# Patient Record
(demographics unavailable — no encounter records)

---

## 2024-10-07 NOTE — HISTORY OF PRESENT ILLNESS
[FreeTextEntry1] : CPE and ankle bite  [de-identified] : This is a 21F with no significant PMHX who presnts for CPE and ankle bite with superificial infection.  1) ankle bite - got 1cm bug bite 3 days ago on skin which she has since been itching and now is slightly swollen/tender with overlying yellow pustules  Diet/exercise: Vegetarian. Bike as exercise.  Social history: Lives with partner. Student studying visual arts, oil painting.  Sexually active/Last menstrual period: Sexually active 1 male partner. No birth control.Dfers STD testing

## 2024-10-07 NOTE — HEALTH RISK ASSESSMENT
[Very Good] : ~his/her~  mood as very good [Yes] : Yes [Monthly or less (1 pt)] : Monthly or less (1 point) [1 or 2 (0 pts)] : 1 or 2 (0 points) [Never (0 pts)] : Never (0 points) [0] : 2) Feeling down, depressed, or hopeless: Not at all (0) [PHQ-2 Negative - No further assessment needed] : PHQ-2 Negative - No further assessment needed [Time Spent: ___ Minutes] : I spent [unfilled] minutes performing a depression screening for this patient. [Current] : Current [0-4] : 0-4 [HIV test declined] : HIV test declined [Hepatitis C test declined] : Hepatitis C test declined [None] : None [With Significant Other] : lives with significant other [Student] : student [College] : College [Significant Other] : lives with significant other [Sexually Active] : sexually active [Feels Safe at Home] : Feels safe at home [Fully functional (bathing, dressing, toileting, transferring, walking, feeding)] : Fully functional (bathing, dressing, toileting, transferring, walking, feeding) [Fully functional (using the telephone, shopping, preparing meals, housekeeping, doing laundry, using] : Fully functional and needs no help or supervision to perform IADLs (using the telephone, shopping, preparing meals, housekeeping, doing laundry, using transportation, managing medications and managing finances) [Seat Belt] :  uses seat belt [High Risk Behavior] : no high risk behavior [Reports changes in hearing] : Reports no changes in hearing [Reports changes in vision] : Reports no changes in vision [Reports changes in dental health] : Reports no changes in dental health

## 2024-10-07 NOTE — ASSESSMENT
[FreeTextEntry1] : HEALTH CARE MAINTENANCE - Influenza vaccine: Defers - Covid vaccine: Vaccinated with 2 boosters - HIV: Deferred  - HEP C: Deferred - TDAP: UTD - Pap smear: GYN referral today

## 2024-11-20 NOTE — PHYSICAL EXAM
[No Respiratory Distress] : no respiratory distress  [No Accessory Muscle Use] : no accessory muscle use [Clear to Auscultation] : lungs were clear to auscultation bilaterally [Normal Percussion] : the chest was normal to percussion [Normal] : no rash

## 2024-11-20 NOTE — HISTORY OF PRESENT ILLNESS
[FreeTextEntry1] : "breathing problems" [de-identified] : This is a 21F with no significant PMHX who presents for acute c/f breathing issues. She feels like she can't take a deep breath. SHe also has intermittent episode of urticaria that self-resolve. Denies any recent infections like covid/flu/URI, no cough, wheezing, nasal congestion, sore throat, chest pain, PENG. She does note moving into a new apartment 4 months ago and her building is undergoing inspection because her neighbors all have a mysterious illness where they have cough. Her neighbor thinks they have mold in the apartment and has been "in a lawsuit with landlord for years" but inspection has not determined this yet. Pt does not have known allergies or asthma. No known pets or recent travel.

## 2025-01-14 NOTE — HISTORY OF PRESENT ILLNESS
[FreeTextEntry1] : follow up [de-identified] : This is a 21F with no significant PMHX who presents for URI symptoms. She started having cough with productive white/yellow sputum since New years day, myalgias, and fatigue. No fevers, sinus pain, ear pain, nasal drip. Has not tried OTC medications but defers cough meds at this time.

## 2025-01-14 NOTE — REVIEW OF SYSTEMS
[Fever] : no fever [Chills] : no chills [Fatigue] : no fatigue [Shortness Of Breath] : no shortness of breath [Wheezing] : no wheezing [Cough] : cough [Dyspnea on Exertion] : no dyspnea on exertion [Negative] : Musculoskeletal

## 2025-04-15 NOTE — HISTORY OF PRESENT ILLNESS
[FreeTextEntry1] : weight loss discussion [de-identified] : This is a 21F with no significant PMHX who presents for weight loss discussion.

## 2025-04-17 NOTE — HISTORY OF PRESENT ILLNESS
[FreeTextEntry1] : weight loss  [de-identified] : This is a 21F with no significant PMHX who presents for weight gain concerns. She's currently 165 and her Goal weight 130s. She eats mainly vegetables and does HIIT classes 3x/week but not achieving the weight loss. She's amenable to nutrition counseling today. Obtain bloodwork to r/o thyroid and diabetic causes.